# Patient Record
Sex: FEMALE | Race: WHITE | NOT HISPANIC OR LATINO | Employment: OTHER | URBAN - METROPOLITAN AREA
[De-identification: names, ages, dates, MRNs, and addresses within clinical notes are randomized per-mention and may not be internally consistent; named-entity substitution may affect disease eponyms.]

---

## 2022-05-15 ENCOUNTER — APPOINTMENT (OUTPATIENT)
Dept: GENERAL RADIOLOGY | Facility: HOSPITAL | Age: 32
End: 2022-05-15

## 2022-05-15 ENCOUNTER — HOSPITAL ENCOUNTER (EMERGENCY)
Facility: HOSPITAL | Age: 32
Discharge: HOME | End: 2022-05-15
Attending: EMERGENCY MEDICINE | Admitting: EMERGENCY MEDICINE

## 2022-05-15 VITALS
HEART RATE: 82 BPM | TEMPERATURE: 98.8 F | SYSTOLIC BLOOD PRESSURE: 111 MMHG | RESPIRATION RATE: 16 BRPM | DIASTOLIC BLOOD PRESSURE: 66 MMHG | OXYGEN SATURATION: 100 %

## 2022-05-15 DIAGNOSIS — S61.211A LACERATION OF LEFT INDEX FINGER WITHOUT FOREIGN BODY WITHOUT DAMAGE TO NAIL, INITIAL ENCOUNTER: Primary | ICD-10-CM

## 2022-05-15 PROCEDURE — 0 LIDOCAINE 1 % SOLUTION: Performed by: PHYSICIAN ASSISTANT

## 2022-05-15 PROCEDURE — 73140 X-RAY EXAM OF FINGER(S): CPT

## 2022-05-15 PROCEDURE — 99282 EMERGENCY DEPT VISIT SF MDM: CPT

## 2022-05-15 RX ORDER — LIDOCAINE HYDROCHLORIDE 10 MG/ML
10 INJECTION, SOLUTION INFILTRATION; PERINEURAL ONCE
Status: COMPLETED | OUTPATIENT
Start: 2022-05-15 | End: 2022-05-15

## 2022-05-15 RX ADMIN — LIDOCAINE HYDROCHLORIDE 10 ML: 10 INJECTION, SOLUTION INFILTRATION; PERINEURAL at 19:46

## 2022-05-15 NOTE — ED NOTES
Patient states she was using an electric  and her left index finger in was cut. Bleeding is controlled in triage. Patient is 14 weeks pregnant.

## 2022-05-15 NOTE — ED PROVIDER NOTES
EMERGENCY DEPARTMENT ENCOUNTER    Room Number:  01/01  Date seen:  5/15/2022  Time seen: 18:47 EDT  PCP: Maureen Garcia APRN  Historian: patient      HPI:  Chief Complaint: left index finger injury    A complete HPI/ROS/PMH/PSH/SH/FH are unobtainable due to: none    Context: Dede Lake is a 31 y.o. female who presents to the ED for evaluation of an injury to the left index finger that occurred just prior to arrival.  She states she was using some electric tree loppers and cut the finger.  She reports a little bit of tingling, denies any functional deficit.  Unsure of her last tetanus vaccine.  No other complaints.  She is 14 weeks pregnant.        PAST MEDICAL HISTORY  Active Ambulatory Problems     Diagnosis Date Noted   • No Active Ambulatory Problems     Resolved Ambulatory Problems     Diagnosis Date Noted   • No Resolved Ambulatory Problems     No Additional Past Medical History         PAST SURGICAL HISTORY  No past surgical history on file.      FAMILY HISTORY  No family history on file.      SOCIAL HISTORY  Social History     Socioeconomic History   • Marital status:          ALLERGIES  Patient has no known allergies.        REVIEW OF SYSTEMS  Review of Systems     All systems reviewed and negative except for those discussed in HPI.       PHYSICAL EXAM  ED Triage Vitals [05/15/22 1713]   Temp Heart Rate Resp BP SpO2   98.8 °F (37.1 °C) 82 16 -- 100 %      Temp src Heart Rate Source Patient Position BP Location FiO2 (%)   -- -- -- -- --         GENERAL: not distressed  HENT: atraumatic  EYES: no scleral icterus  CV: Regular rate  RESPIRATORY: normal effort  ABDOMEN: Nondistended  MUSCULOSKELETAL: no deformity.  There is a laceration over the palmar aspect of the middle phalanx of the left index finger.  She has full flexion extension against resistance, sensation intact distally, cap refill brisk.  Remaining portions of the left hand are unremarkable  NEURO: alert, moves all extremities,  follows commands  SKIN: warm, dry    Vital signs and nursing notes reviewed.        RADIOLOGY  XR Finger 2+ View Left   Final Result   1. Minimal soft tissue deformity with no fractures or foreign bodies.       This report was finalized on 5/15/2022 5:59 PM by Dr. Junior Mtz M.D.              I ordered the above noted radiological studies. Reviewed by me and discussed with radiologist.  See dictation for official radiology interpretation.    PROCEDURES  Laceration Repair  Performed by: Izabel Strange PA  Authorized by: Francisco Fisher MD     Consent:     Consent obtained:  Verbal    Consent given by:  Patient    Risks discussed:  Infection, pain, poor cosmetic result, poor wound healing, nerve damage and retained foreign body    Alternatives discussed:  No treatment  Universal protocol:     Patient identity confirmed:  Verbally with patient  Anesthesia:     Anesthesia method:  Local infiltration    Local anesthetic:  Lidocaine 1% w/o epi  Laceration details:     Location:  Finger    Finger location:  L index finger    Length (cm):  2  Pre-procedure details:     Preparation:  Patient was prepped and draped in usual sterile fashion and imaging obtained to evaluate for foreign bodies  Exploration:     Hemostasis achieved with:  Direct pressure and tourniquet    Imaging obtained: x-ray      Imaging outcome: foreign body not noted      Wound exploration: wound explored through full range of motion and entire depth of wound visualized      Wound extent: no fascia violation noted, no foreign bodies/material noted, no muscle damage noted, no nerve damage noted, no tendon damage noted, no underlying fracture noted and no vascular damage noted      Contaminated: no    Treatment:     Area cleansed with:  Chlorhexidine    Amount of cleaning:  Extensive    Irrigation solution:  Sterile saline    Irrigation method:  Syringe    Debridement:  Minimal    Undermining:  None  Skin repair:     Repair method:  Sutures     Suture size:  5-0    Suture material:  Nylon    Suture technique:  Simple interrupted    Number of sutures:  6  Approximation:     Approximation:  Close  Repair type:     Repair type:  Simple  Post-procedure details:     Dressing:  Antibiotic ointment and adhesive bandage    Procedure completion:  Tolerated well, no immediate complications            MEDICATIONS GIVEN IN ER  Medications   lidocaine (XYLOCAINE) 1 % injection 10 mL (10 mL Injection Given 5/15/22 1946)             PROGRESS AND CONSULTS    DDX includes but not limited to laceration, open fracture, retained foreign body    ED Course as of 05/15/22 2046   Sun May 15, 2022   1724 Medical chart reviewed.  Last Tdap administered 1/13/2021. [KA]   1848 My interpretation of the left finger x-rays is no acute fracture or visible foreign body to the left index finger. [KA]      ED Course User Index  [KA] Izabel Strange PA       The patient tolerated the procedure without complication.  I counseled her on wound care follow-up and indications for return and she is stable for discharge.      Patient was placed in face mask in first look. Patient was wearing facemask each time I entered the room and throughout our encounter. I wore protective equipment throughout this patient encounter including a face mask, eye shield and gloves. Hand hygiene was performed before donning protective equipment and after removal when leaving the room.        DIAGNOSIS  Final diagnoses:   Laceration of left index finger without foreign body without damage to nail, initial encounter         Follow Up:  Maureen Garcia, APRN  [address] [id number] [phone]    In 1 week        RX:     Medication List      No changes were made to your prescriptions during this visit.           Latest Documented Vital Signs:  As of 20:46 EDT  BP- 111/66 HR- 82 Temp- 98.8 °F (37.1 °C) O2 sat- 100%       Izabel Strange PA  05/15/22 2046

## 2022-05-15 NOTE — DISCHARGE INSTRUCTIONS
Cleanse the area daily gently with soap and water.  Apply antibiotic ointment to area twice daily and as needed.  Suture removal to be performed by primary care provider in 7 days.  Avoid prolonged exposure to water (like swimming, dishes, etc.)  Return to the ER or seek re-evaluation sooner for spreading redness, pus drainage, increasing pain, fever >100.5, any concerns.

## 2022-05-15 NOTE — ED PROVIDER NOTES
MD ATTESTATION NOTE    The LILI and I have discussed this patient's history, physical exam, and treatment plan.  I have reviewed the documentation and personally had a face to face interaction with the patient. I affirm the documentation and agree with the treatment and plan.  The attached note describes my personal findings.      I provided a substantive portion of the care of the patient.  I personally performed the physical exam in its entirety, and below are my findings.  For this patient encounter, the patient wore surgical mask, I wore full protective PPE including N95 and eye protection.      Brief HPI: Patient presents with finger laceration.  Was using electric tremors that slipped and cut her left index finger.  She is right-hand dominant.    PHYSICAL EXAM  ED Triage Vitals [05/15/22 1713]   Temp Heart Rate Resp BP SpO2   98.8 °F (37.1 °C) 82 16 -- 100 %      Temp src Heart Rate Source Patient Position BP Location FiO2 (%)   -- -- -- -- --         GENERAL: no acute distress  HENT: nares patent  EYES: no scleral icterus  RESPIRATORY: normal effort  MUSCULOSKELETAL: no deformity  NEURO: alert, moves all extremities, follows commands  PSYCH:  calm, cooperative  SKIN: warm, dry.  Laceration to left index finger    Vital signs and nursing notes reviewed.        Plan: Laceration repair       Francisco Fisher MD  05/15/22 3383